# Patient Record
Sex: FEMALE | Race: BLACK OR AFRICAN AMERICAN | NOT HISPANIC OR LATINO | URBAN - METROPOLITAN AREA
[De-identification: names, ages, dates, MRNs, and addresses within clinical notes are randomized per-mention and may not be internally consistent; named-entity substitution may affect disease eponyms.]

---

## 2020-11-25 ENCOUNTER — EMERGENCY (EMERGENCY)
Facility: HOSPITAL | Age: 55
LOS: 1 days | Discharge: HOME | End: 2020-11-25
Attending: EMERGENCY MEDICINE
Payer: COMMERCIAL

## 2020-11-25 VITALS
RESPIRATION RATE: 16 BRPM | HEART RATE: 120 BPM | DIASTOLIC BLOOD PRESSURE: 129 MMHG | HEIGHT: 64 IN | SYSTOLIC BLOOD PRESSURE: 183 MMHG | TEMPERATURE: 98 F | WEIGHT: 250 LBS | OXYGEN SATURATION: 97 %

## 2020-11-25 VITALS
TEMPERATURE: 98 F | OXYGEN SATURATION: 100 % | HEART RATE: 99 BPM | RESPIRATION RATE: 19 BRPM | SYSTOLIC BLOOD PRESSURE: 185 MMHG | DIASTOLIC BLOOD PRESSURE: 105 MMHG

## 2020-11-25 PROCEDURE — 99283 EMERGENCY DEPT VISIT LOW MDM: CPT

## 2020-11-25 PROCEDURE — 99284 EMERGENCY DEPT VISIT MOD MDM: CPT

## 2020-11-25 RX ORDER — METHOCARBAMOL 500 MG/1
1 TABLET, FILM COATED ORAL
Qty: 20 | Refills: 0
Start: 2020-11-25 | End: 2020-11-29

## 2020-11-25 RX ORDER — METHOCARBAMOL 500 MG/1
1500 TABLET, FILM COATED ORAL ONCE
Refills: 0 | Status: COMPLETED | OUTPATIENT
Start: 2020-11-25 | End: 2020-11-25

## 2020-11-25 NOTE — ED PROVIDER NOTE - PHYSICAL EXAMINATION
Gen: WD/WN, NAD, non-toxic  Head: NCAT  Cardiac: RRR +[id number].   Resp: CTAB  Abd: +BS x 4. Soft, non-tender, non-distended. No guarding or rebound.  Back: Spine midline and non-tender. No step offs. No CVAT. TTP left paraspinal lumbar region. Pt FWB and ambulatory without difficulty  MSK: FROM extremities x 4, no bony ttp  Skin: Warm, dry, no rashes or lesions  Neuro: Awake, alert & oriented x 3. No focal deficits, sensorimotor intact x 4
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
## 2020-11-25 NOTE — ED PROVIDER NOTE - NSFOLLOWUPCLINICS_GEN_ALL_ED_FT
[facility] - [facility]  Neurosurgery/Spine  [address] [place] [address]  Phone: [phone]  Fax:   Follow Up Time:

## 2020-11-25 NOTE — ED PROVIDER NOTE - PROGRESS NOTE DETAILS
VS improved on rpt vs, pt again encouraged to f/u with PMD for BP control. Pt asymptomatic HTN, states she knows she has to f/u, verbalized understanding and agreement with plan to f/u
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
## 2020-11-25 NOTE — ED PROVIDER NOTE - NSFOLLOWUPINSTRUCTIONS_ED_ALL_ED_FT
- Follow up with your doctor within 2-3 days.   - Return to the ED for any new or worsening symptoms included but not limited to weakness, urinary symptoms, numbness, difficulty walking, bowel/bladder incontinence, fevers, etc  - May take ibuprofen 600mg every 6 hours as needed for pain  - May take robaxin 1500mg every 6 hours as needed for pain  - Avoid heavy lifting, significant exertion  - Apply heating pads/warm compresses to affected area while resting   - May follow-up with spine center listed for further evaluation if symptoms persist  - FOLLOW-UP WITH PRIMARY DOCTOR FOR FURTHER EVALUATION OF YOUR BLOOD PRESSURE    Back Pain    Back pain is very common in adults. The cause of back pain is rarely dangerous and the pain often gets better over time. The cause of your back pain may not be known and may include strain of muscles or ligaments, degeneration of the spinal disks, or arthritis. Occasionally the pain may radiate down your leg(s). Over-the-counter medicines to reduce pain and inflammation are often the most helpful. Stretching and remaining active frequently helps the healing process.     SEEK IMMEDIATE MEDICAL CARE IF YOU HAVE ANY OF THE FOLLOWING SYMPTOMS: bowel or bladder control problems, unusual weakness or numbness in your arms or legs, nausea or vomiting, abdominal pain, fever, dizziness/lightheadedness.     Hypertension    Hypertension, commonly called high blood pressure, is when the force of blood pumping through your arteries is too strong. Hypertension forces your heart to work harder to pump blood. Your arteries may become narrow or stiff. Having untreated or uncontrolled hypertension for a long period of time can cause heart attack, stroke, kidney disease, and other problems. If started on a medication, take exactly as prescribed by your health care professional. Maintain a healthy lifestyle and follow up with your primary care physician.    SEEK IMMEDIATE MEDICAL CARE IF YOU HAVE ANY OF THE FOLLOWING SYMPTOMS: severe headache, confusion, chest pain, abdominal pain, vomiting, or shortness of breath.

## 2020-11-25 NOTE — ED PROVIDER NOTE - PATIENT PORTAL LINK FT
You can access the FollowMyHealth Patient Portal offered by [facility] by registering at the following website: [email or web address]. By joining [facility]’s FollowMyHealth portal, you will also be able to view your health information using other applications (apps) compatible with our system.
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
## 2023-05-11 NOTE — ED PROVIDER NOTE - CONDITION AT DISCHARGE:
Improved Soolantra Pregnancy And Lactation Text: This medication is Pregnancy Category C. This medication is considered safe during breast feeding.